# Patient Record
Sex: MALE | Race: WHITE | NOT HISPANIC OR LATINO | Employment: OTHER | URBAN - METROPOLITAN AREA
[De-identification: names, ages, dates, MRNs, and addresses within clinical notes are randomized per-mention and may not be internally consistent; named-entity substitution may affect disease eponyms.]

---

## 2024-04-05 DIAGNOSIS — R97.20 ELEVATED PSA: Primary | ICD-10-CM

## 2024-04-08 ENCOUNTER — OFFICE VISIT (OUTPATIENT)
Dept: UROLOGY | Facility: CLINIC | Age: 74
End: 2024-04-08
Payer: MEDICARE

## 2024-04-08 VITALS
HEIGHT: 71 IN | BODY MASS INDEX: 32.2 KG/M2 | DIASTOLIC BLOOD PRESSURE: 80 MMHG | SYSTOLIC BLOOD PRESSURE: 116 MMHG | OXYGEN SATURATION: 94 % | WEIGHT: 230 LBS | HEART RATE: 76 BPM

## 2024-04-08 DIAGNOSIS — N40.1 BPH WITH URINARY OBSTRUCTION: Primary | ICD-10-CM

## 2024-04-08 DIAGNOSIS — R97.20 ELEVATED PSA: ICD-10-CM

## 2024-04-08 DIAGNOSIS — R35.1 NOCTURIA: ICD-10-CM

## 2024-04-08 DIAGNOSIS — N13.8 BPH WITH URINARY OBSTRUCTION: Primary | ICD-10-CM

## 2024-04-08 PROCEDURE — 3008F BODY MASS INDEX DOCD: CPT | Mod: CPTII,S$GLB,,

## 2024-04-08 PROCEDURE — 3074F SYST BP LT 130 MM HG: CPT | Mod: CPTII,S$GLB,,

## 2024-04-08 PROCEDURE — 1101F PT FALLS ASSESS-DOCD LE1/YR: CPT | Mod: CPTII,S$GLB,,

## 2024-04-08 PROCEDURE — 1160F RVW MEDS BY RX/DR IN RCRD: CPT | Mod: CPTII,S$GLB,,

## 2024-04-08 PROCEDURE — 3288F FALL RISK ASSESSMENT DOCD: CPT | Mod: CPTII,S$GLB,,

## 2024-04-08 PROCEDURE — 1126F AMNT PAIN NOTED NONE PRSNT: CPT | Mod: CPTII,S$GLB,,

## 2024-04-08 PROCEDURE — 1159F MED LIST DOCD IN RCRD: CPT | Mod: CPTII,S$GLB,,

## 2024-04-08 PROCEDURE — 99204 OFFICE O/P NEW MOD 45 MIN: CPT | Mod: S$GLB,,,

## 2024-04-08 PROCEDURE — 3079F DIAST BP 80-89 MM HG: CPT | Mod: CPTII,S$GLB,,

## 2024-04-08 PROCEDURE — 4010F ACE/ARB THERAPY RXD/TAKEN: CPT | Mod: CPTII,S$GLB,,

## 2024-04-08 RX ORDER — LISINOPRIL AND HYDROCHLOROTHIAZIDE 12.5; 2 MG/1; MG/1
2 TABLET ORAL DAILY
COMMUNITY
Start: 2024-04-03

## 2024-04-08 RX ORDER — LABETALOL 100 MG/1
100 TABLET, FILM COATED ORAL ONCE
COMMUNITY
Start: 2023-12-20

## 2024-04-08 RX ORDER — DIAZEPAM 10 MG/1
10 TABLET ORAL 3 TIMES DAILY
COMMUNITY
Start: 2024-03-25

## 2024-04-08 RX ORDER — TAMSULOSIN HYDROCHLORIDE 0.4 MG/1
0.4 CAPSULE ORAL DAILY
Qty: 90 CAPSULE | Refills: 3 | Status: SHIPPED | OUTPATIENT
Start: 2024-04-08 | End: 2025-04-08

## 2024-04-08 RX ORDER — METFORMIN HYDROCHLORIDE 500 MG/1
500 TABLET, EXTENDED RELEASE ORAL 2 TIMES DAILY WITH MEALS
COMMUNITY
Start: 2024-03-18

## 2024-04-08 NOTE — PROGRESS NOTES
Subjective:       Patient ID: Alen Faye is a 73 y.o. male.    Chief Complaint: Elevated PSA      HPI: 73-year-old male new referral from Dr. Bailey sent to clinic today for elevated PSA.  No PSA results were sent with referral and patient unable to remember his PSA results.  Patient reports he was told his last 2 PSA levels have been elevated.  Patient reports mild symptoms of BPH.  He complains of a weaker urinary stream, urinary urgency, and nocturia x2.  He denies urinary frequency, dysuria, foul odor, fever, body aches, bone pain, unexpected weight loss, or decreased urinary output.  Patient reports he currently drinks 2 cokes per day and drinks water routinely until bedtime.       Past Medical History:   Past Medical History:   Diagnosis Date    Type II diabetes mellitus        Past Surgical Historical:   Past Surgical History:   Procedure Laterality Date    FINGER SURGERY Right     middle and ring finger for trigger finger    SHOULDER ARTHROSCOPY Left         Medications:   Medication List with Changes/Refills   New Medications    TAMSULOSIN (FLOMAX) 0.4 MG CAP    Take 1 capsule (0.4 mg total) by mouth once daily.   Current Medications    DIAZEPAM (VALIUM) 10 MG TAB    Take 10 mg by mouth 3 (three) times daily.    LABETALOL (NORMODYNE) 100 MG TABLET    Take 100 mg by mouth once.    LISINOPRIL-HYDROCHLOROTHIAZIDE (PRINZIDE,ZESTORETIC) 20-12.5 MG PER TABLET    Take 2 tablets by mouth once daily.    METFORMIN (GLUCOPHAGE-XR) 500 MG ER 24HR TABLET    Take 500 mg by mouth 2 (two) times daily with meals. Two in the morning        Past Social History:   Social History     Socioeconomic History    Marital status:    Tobacco Use    Smoking status: Never     Passive exposure: Never    Smokeless tobacco: Never   Substance and Sexual Activity    Alcohol use: Not Currently     Alcohol/week: 2.0 standard drinks of alcohol     Types: 2 Cans of beer per week     Comment: socially about 1-2 beers       Allergies:  Review of patient's allergies indicates:  No Known Allergies     Family History:   Family History   Problem Relation Age of Onset    Parkinsonism Father     Alzheimer's disease Father     Throat cancer Mother         Review of Systems:  Review of Systems   Constitutional: Negative.    HENT: Negative.     Eyes: Negative.    Respiratory: Negative.     Cardiovascular: Negative.    Gastrointestinal: Negative.    Endocrine: Negative.    Genitourinary:  Positive for urgency.        Nocturia   Musculoskeletal: Negative.    Skin: Negative.    Allergic/Immunologic: Negative.    Neurological: Negative.    Hematological: Negative.    Psychiatric/Behavioral: Negative.       Physical Exam:  Physical Exam  Constitutional:       Appearance: Normal appearance.   Cardiovascular:      Rate and Rhythm: Normal rate.   Pulmonary:      Effort: Pulmonary effort is normal.   Abdominal:      General: Bowel sounds are normal.      Palpations: Abdomen is soft.   Genitourinary:     Penis: Normal.       Testes: Normal.      Comments: Deferred LEATHA  Neurological:      Mental Status: He is alert and oriented to person, place, and time.   PVR 0 mL  Assessment/Plan:     Elevated PSA/BPH with obstruction:  We will obtain PSA today due to no available previous results.  We will start patient on Flomax for decreased urinary stream and nocturia.  Had discussion with patient about excessive oral fluid intake and decreasing fluids 4 hours prior to bedtime to reduce symptoms of nocturia.    Patient had questions about next steps if PSA is elevated.  Discussed MRI versus prostate biopsy and risk versus benefit of both procedures.  We will notify patient of PSA results and schedule follow-up  Problem List Items Addressed This Visit    None  Visit Diagnoses       Elevated PSA    -  Primary    Relevant Orders    Prostate Specific Antigen, Diagnostic    Nocturia        Relevant Medications    tamsulosin (FLOMAX) 0.4 mg Cap    Other Relevant Orders    POCT  Bladder Scan    Prostate Specific Antigen, Diagnostic

## 2024-04-09 LAB — PSA, DIAGNOSTIC: 5.2 NG/ML

## 2024-04-15 ENCOUNTER — TELEPHONE (OUTPATIENT)
Dept: UROLOGY | Facility: CLINIC | Age: 74
End: 2024-04-15
Payer: MEDICARE

## 2024-04-15 NOTE — TELEPHONE ENCOUNTER
"Unable to reach patient after attempting to contact him three times, no voicemail due to " The wireless caller you are trying to reach is unavailable, please try your call again"  "

## 2024-04-15 NOTE — TELEPHONE ENCOUNTER
----- Message from Anne Riddle LPN sent at 4/10/2024  5:08 PM CDT -----  Spoke with patient in regards to PSA results. Patient verbalized understanding and would like to follow up in 6 months. Was able to schedule nurse visit in October.   Documented follow up with Aida Rico on sheet to be placed in system.   ----- Message -----  From: Aida Rico NP  Sent: 4/9/2024   8:09 AM CDT  To: Jacky Parra Staff    Please call patient and inform PSA is 5.20.  Please call patient and ask him if he would like to have MRI or prostate biopsy.  If he wants to be conservative we can recheck his PSA in 6 months and hold off on either of these procedures.

## 2024-04-15 NOTE — TELEPHONE ENCOUNTER
----- Message from Nirmala Morales sent at 4/15/2024  4:35 PM CDT -----  Regarding: FW: Return Call  Contact: patient    ----- Message -----  From: Navid Matos MA  Sent: 4/15/2024   4:30 PM CDT  To: Josep Kang Staff  Subject: FW: Return Call                                    ----- Message -----  From: Sharonda Jimenez  Sent: 4/15/2024   2:53 PM CDT  To: Kayley Kenny Staff  Subject: Return Call                                      Type:  Patient Returning Call    Who Called:Alen   Who Left Message for Patient: Kimberly   Does the patient know what this is regarding?:psa  Would the patient rather a call back or a response via [facility]?  Call back   Best Call Back Number: [phone] (home)     Additional Information:     TERENCE Tavares

## 2024-09-27 ENCOUNTER — TELEPHONE (OUTPATIENT)
Dept: UROLOGY | Facility: CLINIC | Age: 74
End: 2024-09-27
Payer: MEDICARE

## 2024-09-27 NOTE — TELEPHONE ENCOUNTER
Pt informed of this and will have psa sent to our office.    ----- Message from Aida Rico NP sent at 9/27/2024 10:51 AM CDT -----  His visit in October was supposed to only be a nurse visit for repeat PSA level.  Have them send me the PSA completed by his PCP for review and we can cancel his October lab draw.  After I review the current PSA level we will decide on when he needs to follow up in person  ----- Message -----  From: Saray Salinas MA  Sent: 9/27/2024  10:07 AM CDT  To: Aida Rico NP    Pt had psa done today with another provider. He sees you on 10/21. Will this psa be ok or do yoi want him to come for blood draw prior  ----- Message -----  From: Mile Victoria  Sent: 9/27/2024   9:55 AM CDT  To: Jacky Parra Staff    Type:  Needs Medical Advice    Who Called: pt  Symptoms (please be specific): na   How long has patient had these symptoms:  na  Pharmacy name and phone #:  na  Would the patient rather a call back or a response via [facility]? call  Best Call Back Number: [phone]    Additional Information: requesting a call as he completed a psa for today and would like to see if he will need to repeat in oct or if todays results will suffice

## 2024-10-02 ENCOUNTER — TELEPHONE (OUTPATIENT)
Dept: UROLOGY | Facility: CLINIC | Age: 74
End: 2024-10-02
Payer: MEDICARE

## 2024-10-02 NOTE — TELEPHONE ENCOUNTER
Patient had a nurse visit for a PSA draw in October scheduled however his PCP recently true his PSA and it is elevated.  Please change patient has appointment to an in-person visit to see me to discuss further intervention

## 2024-10-21 ENCOUNTER — OFFICE VISIT (OUTPATIENT)
Dept: UROLOGY | Facility: CLINIC | Age: 74
End: 2024-10-21
Payer: MEDICARE

## 2024-10-21 VITALS
BODY MASS INDEX: 31.5 KG/M2 | DIASTOLIC BLOOD PRESSURE: 64 MMHG | RESPIRATION RATE: 18 BRPM | WEIGHT: 225 LBS | HEART RATE: 70 BPM | HEIGHT: 71 IN | OXYGEN SATURATION: 98 % | SYSTOLIC BLOOD PRESSURE: 112 MMHG

## 2024-10-21 DIAGNOSIS — R97.20 ELEVATED PSA: Primary | ICD-10-CM

## 2024-10-21 PROCEDURE — 4010F ACE/ARB THERAPY RXD/TAKEN: CPT | Mod: CPTII,S$GLB,,

## 2024-10-21 PROCEDURE — 3288F FALL RISK ASSESSMENT DOCD: CPT | Mod: CPTII,S$GLB,,

## 2024-10-21 PROCEDURE — 1126F AMNT PAIN NOTED NONE PRSNT: CPT | Mod: CPTII,S$GLB,,

## 2024-10-21 PROCEDURE — 3074F SYST BP LT 130 MM HG: CPT | Mod: CPTII,S$GLB,,

## 2024-10-21 PROCEDURE — 3008F BODY MASS INDEX DOCD: CPT | Mod: CPTII,S$GLB,,

## 2024-10-21 PROCEDURE — 1160F RVW MEDS BY RX/DR IN RCRD: CPT | Mod: CPTII,S$GLB,,

## 2024-10-21 PROCEDURE — 1159F MED LIST DOCD IN RCRD: CPT | Mod: CPTII,S$GLB,,

## 2024-10-21 PROCEDURE — 3078F DIAST BP <80 MM HG: CPT | Mod: CPTII,S$GLB,,

## 2024-10-21 PROCEDURE — 1101F PT FALLS ASSESS-DOCD LE1/YR: CPT | Mod: CPTII,S$GLB,,

## 2024-10-21 PROCEDURE — 99214 OFFICE O/P EST MOD 30 MIN: CPT | Mod: S$GLB,,,

## 2024-10-21 RX ORDER — SEMAGLUTIDE 0.68 MG/ML
INJECTION, SOLUTION SUBCUTANEOUS
COMMUNITY
Start: 2024-10-05

## 2024-10-21 RX ORDER — HYDROGEN PEROXIDE 3 %
SOLUTION, NON-ORAL MISCELLANEOUS
COMMUNITY
Start: 2024-09-14

## 2024-10-21 NOTE — PROGRESS NOTES
Subjective:       Patient ID: Alen Faye is a 74 y.o. male.    Chief Complaint: No chief complaint on file.      HPI: 74-year-old male established patient presents today for discussion elevated PSA.  Patient recently had his PSA level checked by his PCP which resulted in 7.42.  This has increased since his last check in April where it was 5.20.  Patient denies any lower urinary tract symptoms including dysuria, frequency, urgency, rectal pain/pressure, perineal pressure, bone pain, unexplained/unintentional weight loss/weight gain, or changes in appetite.  He denies a known family history of prostate cancer.  He does have a history of BPH currently maintained with Flomax.  He reports his stream is strong from start to finish with occasional spraying.    PSA:  9/23/24  7.42  4/9/24    5.20       Past Medical History:   Past Medical History:   Diagnosis Date    Type II diabetes mellitus        Past Surgical Historical:   Past Surgical History:   Procedure Laterality Date    FINGER SURGERY Right     middle and ring finger for trigger finger    SHOULDER ARTHROSCOPY Left         Medications:   Medication List with Changes/Refills   Current Medications    DIAZEPAM (VALIUM) 10 MG TAB    Take 10 mg by mouth 3 (three) times daily.    ESOMEPRAZOLE (NEXIUM) 20 MG CAPSULE    Take as needed    LABETALOL (NORMODYNE) 100 MG TABLET    Take 100 mg by mouth once.    LISINOPRIL-HYDROCHLOROTHIAZIDE (PRINZIDE,ZESTORETIC) 20-12.5 MG PER TABLET    Take 2 tablets by mouth once daily.    METFORMIN (GLUCOPHAGE-XR) 500 MG ER 24HR TABLET    Take 500 mg by mouth 2 (two) times daily with meals. Two in the morning    OZEMPIC 0.25 MG OR 0.5 MG (2 MG/3 ML) PEN INJECTOR    INJECT 0.5 MG SUBCUTANEOUSLY ONCE A WEEK    TAMSULOSIN (FLOMAX) 0.4 MG CAP    Take 1 capsule (0.4 mg total) by mouth once daily.        Past Social History:   Social History     Socioeconomic History    Marital status:    Tobacco Use    Smoking status: Never     Passive  exposure: Never    Smokeless tobacco: Never   Substance and Sexual Activity    Alcohol use: Not Currently     Alcohol/week: 2.0 standard drinks of alcohol     Types: 2 Cans of beer per week     Comment: socially about 1-2 beers       Allergies: Review of patient's allergies indicates:  No Known Allergies     Family History:   Family History   Problem Relation Name Age of Onset    Parkinsonism Father      Alzheimer's disease Father      Throat cancer Mother          Review of Systems:  Review of Systems   Constitutional: Negative.    HENT: Negative.     Eyes: Negative.    Respiratory: Negative.     Cardiovascular: Negative.    Gastrointestinal: Negative.    Endocrine: Negative.    Genitourinary: Negative.    Musculoskeletal: Negative.    Skin: Negative.    Allergic/Immunologic: Negative.    Neurological: Negative.    Hematological: Negative.    Psychiatric/Behavioral: Negative.         Physical Exam:  Physical Exam  Constitutional:       Appearance: Normal appearance.   Cardiovascular:      Rate and Rhythm: Normal rate.   Pulmonary:      Effort: Pulmonary effort is normal.   Abdominal:      General: Bowel sounds are normal.      Palpations: Abdomen is soft.   Genitourinary:     Comments: Deferred LEATHA  Neurological:      Mental Status: He is alert and oriented to person, place, and time.       Assessment/Plan:     Elevated PSA:  Discussed with patient undergoing prostate biopsy however patient has some reservations about the procedure.  We will schedule him for MRI of the prostate.  We did discuss that if his MRI results and PI-RADS 3 4 or 5 he would need to undergo prostate biopsy to determine if prostate cancer was present.  Patient is agreeable to undergo prostate biopsy if MRI results indicate the need.    Follow up to be arranged pending results of MRI  Problem List Items Addressed This Visit    None  Visit Diagnoses       Elevated PSA    -  Primary    Relevant Orders    MRI Prostate W W/O Contrast

## 2024-11-14 ENCOUNTER — TELEPHONE (OUTPATIENT)
Dept: UROLOGY | Facility: CLINIC | Age: 74
End: 2024-11-14
Payer: MEDICARE

## 2024-11-14 NOTE — TELEPHONE ENCOUNTER
Notified patient of results and he agreered to have biopsy. Would like to have it done in hospital . Was driving and could not make surgery appt at this time. Will come in for consents a chose date then.           ----- Message from Aida Rico NP sent at 11/14/2024  8:02 AM CST -----  Please call and inform patient his MRI of the prostate indicates a PI-RADS 5 which gives him a very high significance that cancer is present.  At this time I recommend he undergo a targeted prostate biopsy.  Please call and discuss with patient to ensure he is agreeable to the prostate biopsy and if he is place the order and evaluate if clearance as needed

## 2024-11-14 NOTE — TELEPHONE ENCOUNTER
----- Message from Aida Rico NP sent at 11/14/2024  8:02 AM CST -----  Please call and inform patient his MRI of the prostate indicates a PI-RADS 5 which gives him a very high significance that cancer is present.  At this time I recommend he undergo a targeted prostate biopsy.  Please call and discuss with patient to ensure he is agreeable to the prostate biopsy and if he is place the order and evaluate if clearance as needed

## 2024-11-20 ENCOUNTER — CLINICAL SUPPORT (OUTPATIENT)
Dept: UROLOGY | Facility: CLINIC | Age: 74
End: 2024-11-20
Payer: MEDICARE

## 2024-11-20 DIAGNOSIS — R97.20 ELEVATED PSA: Primary | ICD-10-CM

## 2024-11-20 DIAGNOSIS — R93.5 ABNORMAL MRI, PELVIS: ICD-10-CM

## 2024-11-20 NOTE — PROGRESS NOTES
Updated care team members, medications, medical and surgical history. Put in procedure order.    Consents and education completed for TRUS BX at [facility] on 12/4/24 provided clearance. Pt given highlighted written instructions after I verbally went over them with him, copy of faxed pre op order and medication list. Provided pt clinic phone number to call with any concerns or questions before or after procedure. Pt was given opportunity to ask questions which he did and I answered them and referred back to highlighted written instructions.

## 2024-11-21 RX ORDER — CIPROFLOXACIN 500 MG/1
500 TABLET ORAL 2 TIMES DAILY
Qty: 8 TABLET | Refills: 0 | Status: SHIPPED | OUTPATIENT
Start: 2024-11-21 | End: 2024-11-25

## 2024-11-25 LAB
ANION GAP SERPL CALC-SCNC: 4 MMOL/L (ref 3–11)
APPEARANCE, UA: CLEAR
BASOPHILS NFR BLD: 0.6 % (ref 0–3)
BILIRUB UR QL STRIP: NEGATIVE MG/DL
BUN SERPL-MCNC: 23 MG/DL (ref 7–18)
BUN/CREAT SERPL: 23.71 RATIO (ref 7–18)
CALCIUM SERPL-MCNC: 9.2 MG/DL (ref 8.8–10.5)
CHLORIDE SERPL-SCNC: 103 MMOL/L (ref 100–108)
CO2 SERPL-SCNC: 30 MMOL/L (ref 21–32)
COLOR UR: NORMAL
CREAT SERPL-MCNC: 0.97 MG/DL (ref 0.7–1.3)
EOSINOPHIL NFR BLD: 1.7 % (ref 1–3)
ERYTHROCYTE [DISTWIDTH] IN BLOOD BY AUTOMATED COUNT: 13.4 % (ref 12.5–18)
GFR ESTIMATION: 82
GLUCOSE (UA): NORMAL MG/DL
GLUCOSE SERPL-MCNC: 100 MG/DL (ref 70–110)
HCT VFR BLD AUTO: 37.6 % (ref 42–52)
HGB BLD-MCNC: 12.6 G/DL (ref 14–18)
HGB UR QL STRIP: NEGATIVE /UL
KETONES UR QL STRIP: NEGATIVE MG/DL
LEUKOCYTE ESTERASE UR QL STRIP: NEGATIVE /UL
LYMPHOCYTES NFR BLD: 21.2 % (ref 25–40)
MCH RBC QN AUTO: 28.3 PG (ref 27–31.2)
MCHC RBC AUTO-ENTMCNC: 33.5 G/DL (ref 31.8–35.4)
MCV RBC AUTO: 84.5 FL (ref 80–97)
MONOCYTES NFR BLD: 13.4 % (ref 1–15)
NEUTROPHILS # BLD AUTO: 3.95 10*3/UL (ref 1.8–7.7)
NEUTROPHILS NFR BLD: 62.6 % (ref 37–80)
NITRITE UR QL STRIP: NEGATIVE
NUCLEATED RED BLOOD CELLS: 0 %
PH UR STRIP: 5 PH (ref 5–8)
PLATELETS: 184 10*3/UL (ref 142–424)
POTASSIUM SERPL-SCNC: 4.4 MMOL/L (ref 3.6–5.2)
PROT UR QL STRIP: NEGATIVE MG/DL
RBC # BLD AUTO: 4.45 10*6/UL (ref 4.7–6.1)
SODIUM BLD-SCNC: 137 MMOL/L (ref 135–145)
SP GR UR STRIP: 1.01 (ref 1–1.03)
SPECIMEN COLLECTION METHOD, URINE: NORMAL
UROBILINOGEN UR STRIP-ACNC: NORMAL MG/DL
WBC # BLD: 6.3 10*3/UL (ref 4.6–10.2)

## 2024-12-03 ENCOUNTER — TELEPHONE (OUTPATIENT)
Dept: UROLOGY | Facility: CLINIC | Age: 74
End: 2024-12-03
Payer: MEDICARE

## 2024-12-03 DIAGNOSIS — R97.20 ELEVATED PSA: Primary | ICD-10-CM

## 2024-12-03 NOTE — TELEPHONE ENCOUNTER
Spoke with pt about [facility] canceled TRUS bx related to abnormal EKG. Pt does not currently have a cardiologist, but told me his pcp referred him to one. I offered doing TRUS BX in clinic since we would not need clearance. (Spoke with ANGELIQUE, NP). Pt states he will think about it, see how long it takes to see cardio and will call us in a few days.

## 2024-12-03 NOTE — TELEPHONE ENCOUNTER
Contacted pt, he is wanting to proceed with in clinic bx. He states that his PCP has referred him to a cardiologist, he is not sure of when he will be scheduled nor cleared. He states that if he is cleared before scheduled in clinic BX and can proceed with hospital BX before that time he would rather that. Advised pt contact us once he have Cardiac appt. Pt verbalized understanding. BJP    ----- Message from [employer] sent at 12/3/2024  2:36 PM CST -----  Contact: self  Type:  Patient Returning Call    Who Called:Alen Faye  Who Left Message for Patient:unsure  Does the patient know what this is regarding?:bx at office instead of hospital  Would the patient rather a call back or a response via [facility]?   Best Call Back Number:[phone]  Additional Information: n/a

## 2024-12-23 ENCOUNTER — TELEPHONE (OUTPATIENT)
Dept: UROLOGY | Facility: CLINIC | Age: 74
End: 2024-12-23
Payer: MEDICARE

## 2025-01-03 RX ORDER — APIXABAN 5 MG/1
TABLET, FILM COATED ORAL
COMMUNITY
Start: 2024-12-11

## 2025-01-06 ENCOUNTER — TELEPHONE (OUTPATIENT)
Dept: UROLOGY | Facility: CLINIC | Age: 75
End: 2025-01-06

## 2025-01-06 ENCOUNTER — CLINICAL SUPPORT (OUTPATIENT)
Dept: UROLOGY | Facility: CLINIC | Age: 75
End: 2025-01-06
Payer: MEDICARE

## 2025-01-06 DIAGNOSIS — Z79.2 PROPHYLACTIC ANTIBIOTIC: Primary | ICD-10-CM

## 2025-01-06 DIAGNOSIS — R97.20 ELEVATED PSA: ICD-10-CM

## 2025-01-06 DIAGNOSIS — R93.5 ABNORMAL MRI, PELVIS: Primary | ICD-10-CM

## 2025-01-06 RX ORDER — CIPROFLOXACIN 500 MG/1
500 TABLET ORAL 2 TIMES DAILY
Qty: 8 TABLET | Refills: 0 | Status: SHIPPED | OUTPATIENT
Start: 2025-01-06 | End: 2025-01-10

## 2025-01-06 RX ORDER — ISOSORBIDE MONONITRATE 30 MG/1
30 TABLET, EXTENDED RELEASE ORAL DAILY
COMMUNITY
Start: 2025-01-06

## 2025-01-06 NOTE — TELEPHONE ENCOUNTER
Notified patient that cardiac clearance had been received from Dr. Sae Dumont's office & that it is ok to proceed with his prostate biopsy scheduled on 1/21/2025 & that he needs to stop his eliquis 72 hours prior to procedure. Verbalized understanding.

## 2025-01-06 NOTE — PROGRESS NOTES
Patient here for prostate biopsy education & to sign consents. Educated on procedure including risks & alternatives. Pre-procedure instructions reviewed & copy given. Patient  states that his cardiologist would not clear him for hospital prostate biopsy, however he did clear him for in-office biopsy. Patient is taking eliquis 5 mg BID. He stated that we should receive a clearance from Dr. Dumont today letting us know when he needs to stop taking eliquis. Instructed that we would call & let him know as soon as we receive. Instructed him to begin taking cipro 500 mg BID on Sunday, January 20, 2025 & to follow instructions on prescription to complete 4 day regimen. Questions answered, verbalized understanding & consents signed.

## 2025-02-06 ENCOUNTER — PROCEDURE VISIT (OUTPATIENT)
Dept: UROLOGY | Facility: CLINIC | Age: 75
End: 2025-02-06
Payer: MEDICARE

## 2025-02-06 VITALS
OXYGEN SATURATION: 97 % | SYSTOLIC BLOOD PRESSURE: 111 MMHG | HEART RATE: 98 BPM | RESPIRATION RATE: 20 BRPM | DIASTOLIC BLOOD PRESSURE: 55 MMHG | BODY MASS INDEX: 31.1 KG/M2 | WEIGHT: 222.13 LBS | HEIGHT: 71 IN

## 2025-02-06 DIAGNOSIS — R97.20 ELEVATED PSA: ICD-10-CM

## 2025-02-06 PROCEDURE — 76872 US TRANSRECTAL: CPT | Mod: 26,S$PBB,, | Performed by: UROLOGY

## 2025-02-06 PROCEDURE — 55700 TRANSRECTAL ULTRASOUND W/ BIOPSY: CPT | Mod: S$PBB,,, | Performed by: UROLOGY

## 2025-02-06 NOTE — PROCEDURES
"Transrectal Ultrasound w/ Biopsy    Date/Time: 2/6/2025 2:00 PM    Performed by: Efraín Zaldivar MD  Authorized by: Efraín Zaldivar MD    Consent Done?:  Yes (Written)  Time out: Immediately prior to procedure a "time out" was called to verify the correct patient, procedure, equipment, support staff and site/side marked as required.    Indications: Elevated PSA    Position:  Left lateral  Anesthesia:  Pudendal nerve block  Patient sedated: No    Prostate Size:  32g  Left Base Biopsies: 2  Left Mid Biopsies: 2  Left [place] Biopsies: 2  Right Base Biopsies: 2  Right Mid Biopsies: 2  Right [place] Biopsies: 2  Total Biopsies:  12    Patient tolerance:  Patient tolerated the procedure well with no immediate complications     Patient was brought to the procedure room placed on the table in left lateral decubitus position lidocaine jelly was instilled into the rectum the ultrasound probe was advanced to level of prostate and a total of 10 cc of lidocaine was injected into the bilateral [place] prostatic fossa.  A standard 12 core prostate biopsy was obtained patient tolerated the procedure well there were no complications    "

## 2025-02-06 NOTE — PATIENT INSTRUCTIONS
NO STRENUOUS ACTIVITY FOR 3 DAYS  NO SEXUAL INTERCOURSE FOR 3 DAYS  YOU MAY NOTICE BLOOD IN URINE OR SEMEN FOR SEVERAL DAYS                What to Expect After a Prostate Biopsy    You may have mild bleeding from the rectum or urine for about 1 week to 1 month, or in your ejaculate for several months. This bleeding is normal and expected, and it will stop. You may have mild discomfort in your rectal or urethral area for 24-48 hours.    You cannot do any strenuous lifting, straining, or exercising for 72 hours. You may return to full activity 3 days after the biopsy.    You may continue to take all your regular medications after the procedure except for the blood thinners.    You may resume all blood-thinning medications once you no longer see any bleeding or whenever your physician prescribing the medication says it is all right to do so. You may take Tylenol if you have a fever and your temperature is less than 100° F or if you have some discomfort.    You will receive a call from the Urology Department at [facility] with the results of your prostate biopsy within one week.    Signs and Symptoms to Report    Call your [facility] urologist at [phone] if you develop any of the following:  Temperature greater than 101°  F  Inability to urinate  A large amount of bleeding from the rectum or in the urine  Persistent or severe pain    After hours or on weekends, you may reach a urology resident on call at this number: [phone].

## 2025-02-18 ENCOUNTER — OFFICE VISIT (OUTPATIENT)
Dept: UROLOGY | Facility: CLINIC | Age: 75
End: 2025-02-18
Payer: MEDICARE

## 2025-02-18 DIAGNOSIS — C61 PROSTATE CANCER: Primary | ICD-10-CM

## 2025-02-18 NOTE — PROGRESS NOTES
Subjective:       Patient ID: Alen Faye is a 74 y.o. male.    Chief Complaint: biopsy results      HPI: 74-year-old male established patient presents today for initial prostate biopsy results completed on 02/06/2025.  Most recent PSA on 09/23/2024 had increased to 7.42 from 5.20 with his PCP.  Patient had MRI of prostate on 11/12/2014 which showed PI-RADS 5 lesion.  Biopsy resulted with 2/12 cores [place] 9, 1/12 core [place] 8, 1/12 core [place] 7, and 1/12 core [place] 6.    PSA:  9/23/24  7.42  4/9/24    5.20       Past Medical History:   Past Medical History:   Diagnosis Date    Anxiety disorder, unspecified     GERD (gastroesophageal reflux disease)     Hypertension     Skin cancer     Type II diabetes mellitus        Past Surgical Historical:   Past Surgical History:   Procedure Laterality Date    COLONOSCOPY      FINGER SURGERY Right     middle and ring finger for trigger finger    SHOULDER ARTHROSCOPY Left     SKIN CANCER EXCISION      TONSILLECTOMY          Medications:   Medication List with Changes/Refills   Current Medications    DIAZEPAM (VALIUM) 10 MG TAB    Take 10 mg by mouth 3 (three) times daily.    ELIQUIS 5 MG TAB        ESOMEPRAZOLE (NEXIUM) 20 MG CAPSULE    Take as needed    ISOSORBIDE MONONITRATE (IMDUR) 30 MG 24 HR TABLET    Take 30 mg by mouth once daily.    LABETALOL (NORMODYNE) 100 MG TABLET    Take 100 mg by mouth once.    LISINOPRIL-HYDROCHLOROTHIAZIDE (PRINZIDE,ZESTORETIC) 20-12.5 MG PER TABLET    Take 2 tablets by mouth once daily.    METFORMIN (GLUCOPHAGE-XR) 500 MG ER 24HR TABLET    Take 500 mg by mouth 2 (two) times daily with meals. Two in the morning    OZEMPIC 0.25 MG OR 0.5 MG (2 MG/3 ML) PEN INJECTOR    INJECT 0.5 MG SUBCUTANEOUSLY ONCE A WEEK    TAMSULOSIN (FLOMAX) 0.4 MG CAP    Take 1 capsule (0.4 mg total) by mouth once daily.        Past Social History:   Social History     Socioeconomic History    Marital status:    Tobacco Use    Smoking status: Never      Passive exposure: Never    Smokeless tobacco: Never   Substance and Sexual Activity    Alcohol use: Not Currently     Alcohol/week: 2.0 standard drinks of alcohol     Types: 2 Cans of beer per week     Comment: socially about 1-2 beers       Allergies: Review of patient's allergies indicates:  No Known Allergies     Family History:   Family History   Problem Relation Name Age of Onset    Parkinsonism Father      Alzheimer's disease Father      Throat cancer Mother          Review of Systems:  Review of Systems   Constitutional: Negative.    HENT: Negative.     Eyes: Negative.    Respiratory: Negative.     Cardiovascular: Negative.    Gastrointestinal: Negative.    Endocrine: Negative.    Genitourinary: Negative.    Musculoskeletal: Negative.    Skin: Negative.    Allergic/Immunologic: Negative.    Neurological: Negative.    Hematological: Negative.    Psychiatric/Behavioral: Negative.         Physical Exam:  Physical Exam  Constitutional:       General: He is not in acute distress.     Appearance: Normal appearance. He is well-developed.   HENT:      Head: Normocephalic and atraumatic.      Nose: Nose normal.   Eyes:      Pupils: Pupils are equal, round, and reactive to light.   Cardiovascular:      Rate and Rhythm: Normal rate and regular rhythm.   Pulmonary:      Effort: Pulmonary effort is normal. No respiratory distress.   Abdominal:      General: Bowel sounds are normal. There is no distension.      Palpations: Abdomen is soft.   Genitourinary:     Comments: Deferred LEATHA  Musculoskeletal:         General: No deformity. Normal range of motion.      Cervical back: Normal range of motion.   Skin:     General: Skin is warm and dry.   Neurological:      Mental Status: He is alert and oriented to person, place, and time.   Psychiatric:         Behavior: Behavior normal.         Assessment/Plan:     Prostate cancer:  I had a long discussion with the patient regarding treatment for prostate cancer and all of the  available options.  We discussed external beam radiation and all of the risks and benefits, discussed brachytherapy as well as robotic assisted radical prostatectomy.  We had a long discussion on surgery itself including the risks of worsening erectile dysfunction, urinary incontinence, injury to the bowel or anastomotic leak.  All questions were answered the patient reported having a clear understanding of the treatment of prostate cancer and the risk and benefits of each modality.  The appropriate amount of time was spent in face-to-face communication discussing the treatment for prostate cancer.    Order placed for CT and bone scan.  We will also refer the patient to Dr. Samson for further evaluation and treatment      Problem List Items Addressed This Visit    None

## 2025-02-21 ENCOUNTER — RESULTS FOLLOW-UP (OUTPATIENT)
Dept: UROLOGY | Facility: CLINIC | Age: 75
End: 2025-02-21
Payer: MEDICARE

## 2025-02-21 NOTE — TELEPHONE ENCOUNTER
Spoke with pt and informed of results.    ----- Message from Milagro Pitts NP sent at 2/21/2025  1:38 PM CST -----  Please notify patient of bone scan results.  Patient should still have CT abdomen and pelvis completed and continue follow up with Dr. Samson.    No evidence of osseous metastatic disease based on bone scan.  Continue for further evaluation with CT of abdomen and pelvis  ----- Message -----  From: Interface, Incoming Rad Results  Sent: 2/21/2025   1:26 PM CST  To: Milagro Pitts NP

## 2025-02-25 ENCOUNTER — RESULTS FOLLOW-UP (OUTPATIENT)
Dept: UROLOGY | Facility: CLINIC | Age: 75
End: 2025-02-25
Payer: MEDICARE

## 2025-02-25 NOTE — TELEPHONE ENCOUNTER
Spoke with pt and informed of results.    ----- Message from Efraín Zaldivar MD sent at 2/25/2025  7:44 AM CST -----  Please call the patient tell him there is no evidence of metastatic disease on the CT scan.  The CT scan looks good  ----- Message -----  From: Milagro Pitts NP  Sent: 2/24/2025   4:59 PM CST  To: Efraín Zaldivar MD      ----- Message -----  From: Samir, Incoming Rad Results  Sent: 2/24/2025  11:51 AM CST  To: Milagro Pitts NP

## 2025-02-28 ENCOUNTER — TELEPHONE (OUTPATIENT)
Dept: HEMATOLOGY/ONCOLOGY | Facility: CLINIC | Age: 75
End: 2025-02-28
Payer: MEDICARE

## 2025-02-28 NOTE — TELEPHONE ENCOUNTER
Spoke to the patient regarding referral. Appointment made per request. All questions answered. TTRN

## 2025-02-28 NOTE — TELEPHONE ENCOUNTER
Spoke to the patient who rescheduled appointment to the following week. Appointment moved per patient request. TTRN

## 2025-03-06 ENCOUNTER — OFFICE VISIT (OUTPATIENT)
Dept: UROLOGY | Facility: CLINIC | Age: 75
End: 2025-03-06
Payer: MEDICARE

## 2025-03-06 DIAGNOSIS — C61 PROSTATE CANCER: Primary | ICD-10-CM

## 2025-03-06 PROCEDURE — 4010F ACE/ARB THERAPY RXD/TAKEN: CPT | Mod: CPTII,,, | Performed by: UROLOGY

## 2025-03-06 PROCEDURE — 1159F MED LIST DOCD IN RCRD: CPT | Mod: CPTII,,, | Performed by: UROLOGY

## 2025-03-06 PROCEDURE — 99214 OFFICE O/P EST MOD 30 MIN: CPT | Mod: S$PBB,,, | Performed by: UROLOGY

## 2025-03-06 NOTE — PROGRESS NOTES
Subjective:       Patient ID: Alen Faye is a 74 y.o. male.    Chief Complaint: eligard injection      HPI: 74-year-old male established patient presents today for initial Eligard injection.  Initial prostate biopsy completed on 02/06/2025.  Most recent PSA on 09/23/2024 had increased to 7.42 from 5.20 with his PCP.  Patient had MRI of prostate on 11/12/2014 which showed PI-RADS 5 lesion.  Biopsy resulted with 2/12 cores [place] 9, 1/12 core [place] 8, 1/12 core [place] 7, and 1/12 core Oralia 6.  He had a negative CT and bone scan.  He will pre proceeding with external beam radiation and ADT as well as following up with Medical Oncology for consideration of Casodex versus Zytiga    PSA:  9/23/24  7.42  4/9/24    5.20       Past Medical History:   Past Medical History:   Diagnosis Date    Anxiety disorder, unspecified     GERD (gastroesophageal reflux disease)     Hypertension     Skin cancer     Type II diabetes mellitus        Past Surgical Historical:   Past Surgical History:   Procedure Laterality Date    COLONOSCOPY      FINGER SURGERY Right     middle and ring finger for trigger finger    SHOULDER ARTHROSCOPY Left     SKIN CANCER EXCISION      TONSILLECTOMY          Medications:   Medication List with Changes/Refills   Current Medications    DIAZEPAM (VALIUM) 10 MG TAB    Take 10 mg by mouth 3 (three) times daily.    ELIQUIS 5 MG TAB        ESOMEPRAZOLE (NEXIUM) 20 MG CAPSULE    Take as needed    ISOSORBIDE MONONITRATE (IMDUR) 30 MG 24 HR TABLET    Take 30 mg by mouth once daily.    LABETALOL (NORMODYNE) 100 MG TABLET    Take 100 mg by mouth once.    LISINOPRIL-HYDROCHLOROTHIAZIDE (PRINZIDE,ZESTORETIC) 20-12.5 MG PER TABLET    Take 2 tablets by mouth once daily.    METFORMIN (GLUCOPHAGE-XR) 500 MG ER 24HR TABLET    Take 500 mg by mouth 2 (two) times daily with meals. Two in the morning    OZEMPIC 0.25 MG OR 0.5 MG (2 MG/3 ML) PEN INJECTOR    INJECT 0.5 MG SUBCUTANEOUSLY ONCE A WEEK    TAMSULOSIN (FLOMAX)  0.4 MG CAP    Take 1 capsule (0.4 mg total) by mouth once daily.        Past Social History:   Social History     Socioeconomic History    Marital status:    Tobacco Use    Smoking status: Never     Passive exposure: Never    Smokeless tobacco: Never   Substance and Sexual Activity    Alcohol use: Not Currently     Alcohol/week: 2.0 standard drinks of alcohol     Types: 2 Cans of beer per week     Comment: socially about 1-2 beers       Allergies: Review of patient's allergies indicates:  No Known Allergies     Family History:   Family History   Problem Relation Name Age of Onset    Parkinsonism Father      Alzheimer's disease Father      Throat cancer Mother          Review of Systems:  Review of Systems   Constitutional: Negative.    HENT: Negative.     Eyes: Negative.    Respiratory: Negative.     Cardiovascular: Negative.    Gastrointestinal: Negative.    Endocrine: Negative.    Genitourinary: Negative.    Musculoskeletal: Negative.    Skin: Negative.    Allergic/Immunologic: Negative.    Neurological: Negative.    Hematological: Negative.    Psychiatric/Behavioral: Negative.         Physical Exam:  Physical Exam  Constitutional:       General: He is not in acute distress.     Appearance: Normal appearance. He is well-developed.   HENT:      Head: Normocephalic and atraumatic.      Nose: Nose normal.   Eyes:      Pupils: Pupils are equal, round, and reactive to light.   Cardiovascular:      Rate and Rhythm: Normal rate and regular rhythm.   Pulmonary:      Effort: Pulmonary effort is normal. No respiratory distress.   Abdominal:      General: Bowel sounds are normal. There is no distension.      Palpations: Abdomen is soft.   Genitourinary:     Comments: Deferred LEATHA  Musculoskeletal:         General: No deformity. Normal range of motion.      Cervical back: Normal range of motion.   Skin:     General: Skin is warm and dry.   Neurological:      Mental Status: He is alert and oriented to person, place, and  time.   Psychiatric:         Behavior: Behavior normal.         Assessment/Plan:     Prostate cancer:  Initial Eligard administered in clinic today.  Continue follow up with Dr. Samson as well as Medical Oncology for further evaluation.  Return to clinic in 6 months for next Eligard.    I, Dr. Efraín Zaldivar have seen and personally evaluated the patient. I have formulated the plan reviewed all pertinent imaging and clinical data.  I agree with the nurse practitioner's assessment, and I have personally formulated the plan for this patient's care as described by the midlevel.  Problem List Items Addressed This Visit    None

## 2025-03-06 NOTE — PROGRESS NOTES
Using aseptic technique, initial Eligard subq administered. Upon observation post 10 minutes, pt denies any allergic reactions. Scheduled 6 mos appt.

## 2025-03-10 ENCOUNTER — OFFICE VISIT (OUTPATIENT)
Dept: HEMATOLOGY/ONCOLOGY | Facility: CLINIC | Age: 75
End: 2025-03-10
Payer: MEDICARE

## 2025-03-10 VITALS
DIASTOLIC BLOOD PRESSURE: 62 MMHG | HEIGHT: 71 IN | TEMPERATURE: 98 F | HEART RATE: 83 BPM | BODY MASS INDEX: 31.26 KG/M2 | OXYGEN SATURATION: 96 % | SYSTOLIC BLOOD PRESSURE: 100 MMHG | WEIGHT: 223.31 LBS | RESPIRATION RATE: 16 BRPM

## 2025-03-10 DIAGNOSIS — C61 PROSTATE CANCER: Primary | ICD-10-CM

## 2025-03-10 PROCEDURE — G2211 COMPLEX E/M VISIT ADD ON: HCPCS | Mod: S$PBB,,, | Performed by: INTERNAL MEDICINE

## 2025-03-10 PROCEDURE — 4010F ACE/ARB THERAPY RXD/TAKEN: CPT | Mod: CPTII,,, | Performed by: INTERNAL MEDICINE

## 2025-03-10 PROCEDURE — 3074F SYST BP LT 130 MM HG: CPT | Mod: CPTII,,, | Performed by: INTERNAL MEDICINE

## 2025-03-10 PROCEDURE — 99205 OFFICE O/P NEW HI 60 MIN: CPT | Mod: S$PBB,,, | Performed by: INTERNAL MEDICINE

## 2025-03-10 PROCEDURE — 1159F MED LIST DOCD IN RCRD: CPT | Mod: CPTII,,, | Performed by: INTERNAL MEDICINE

## 2025-03-10 PROCEDURE — 1158F ADVNC CARE PLAN TLK DOCD: CPT | Mod: CPTII,,, | Performed by: INTERNAL MEDICINE

## 2025-03-10 PROCEDURE — 3078F DIAST BP <80 MM HG: CPT | Mod: CPTII,,, | Performed by: INTERNAL MEDICINE

## 2025-03-10 PROCEDURE — 3008F BODY MASS INDEX DOCD: CPT | Mod: CPTII,,, | Performed by: INTERNAL MEDICINE

## 2025-03-10 RX ORDER — BICALUTAMIDE 50 MG/1
50 TABLET, FILM COATED ORAL DAILY
Qty: 90 TABLET | Refills: 3 | Status: SHIPPED | OUTPATIENT
Start: 2025-03-10 | End: 2026-03-10

## 2025-03-10 RX ORDER — ZOLPIDEM TARTRATE 12.5 MG/1
12.5 TABLET, FILM COATED, EXTENDED RELEASE ORAL NIGHTLY
COMMUNITY
Start: 2025-03-06

## 2025-03-10 NOTE — PROGRESS NOTES
MEDICAL ONCOLOGY INITIAL CONSULTATION NOTE    Patient ID: Alen Faye is a 74 y.o. male.    Chief Complaint:  High-risk prostate cancer    HPI:  Patient is a 74-year-old male with past medical history of coronary artery disease, atrial fibrillation, diabetes mellitus, hypertension, hyperlipidemia, gastroesophageal reflux disease, osteoarthritis, very high-risk prostate cancer, [place] 4 + 5 and 5 x 12 cores clinically cT3 a N0 M0 with initial PSA of 7.42        Imaging:       MR prostate with and without contrast:  11/12/2024    2 x 1.3 x 1.9 cm T2 hypo, diffusion restricted, early enhancing lesion at the left base to mid lateral PZ with maryjo EPE and lateral Lt NVB invasion.  No S3, lymph node or bone involvement.  PI-RADS 5    Past Medical History:   Diagnosis Date    Anxiety disorder, unspecified     GERD (gastroesophageal reflux disease)     Hypertension     Skin cancer     Type II diabetes mellitus      Family History   Problem Relation Name Age of Onset    Parkinsonism Father      Alzheimer's disease Father      Throat cancer Mother       Social History[1]  Past Surgical History:   Procedure Laterality Date    COLONOSCOPY      FINGER SURGERY Right     middle and ring finger for trigger finger    SHOULDER ARTHROSCOPY Left     SKIN CANCER EXCISION      TONSILLECTOMY           Review of systems:  Review of Systems    Review of Systems   Constitutional: Negative for activity change, appetite change, chills, diaphoresis, fatigue and unexpected weight change.   HENT: Negative for congestion, facial swelling, hearing loss, mouth sores, trouble swallowing and voice change.    Eyes: Negative for photophobia, pain, discharge and itching.   Respiratory: Negative for apnea, cough, choking, chest tightness and shortness of breath.    Cardiovascular: Negative for chest pain, palpitations and leg swelling.   Gastrointestinal: Negative for abdominal distention, abdominal pain, anal bleeding and blood in stool.   Endocrine:  Negative for cold intolerance, heat intolerance, polydipsia and polyphagia.   Genitourinary: Negative for difficulty urinating, dysuria, flank pain and hematuria.   Musculoskeletal: Negative for arthralgias, back pain, joint swelling, myalgias, neck pain and neck stiffness.   Skin: Negative for color change, pallor and wound.   Allergic/Immunologic: Negative for environmental allergies, food allergies and immunocompromised state.   Neurological: Negative for dizziness, seizures, facial asymmetry, speech difficulty, light-headedness, numbness and headaches.   Hematological: Negative for adenopathy. Does not bruise/bleed easily.   Psychiatric/Behavioral: Negative for agitation, behavioral problems, confusion, decreased concentration and sleep disturbance.       Physical Exam  Vitals and nursing note reviewed.   Constitutional:       General: He is not in acute distress.     Appearance: Normal appearance. He is not ill-appearing.   HENT:      Head: Normocephalic and atraumatic.      Nose: No congestion or rhinorrhea.   Eyes:      General: No scleral icterus.     Extraocular Movements: Extraocular movements intact.      Pupils: Pupils are equal, round, and reactive to light.   Cardiovascular:      Rate and Rhythm: Normal rate and regular rhythm.      Pulses: Normal pulses.      Heart sounds: Normal heart sounds. No murmur heard.     No gallop.   Pulmonary:      Effort: Pulmonary effort is normal. No respiratory distress.      Breath sounds: Normal breath sounds. No stridor. No wheezing or rhonchi.   Abdominal:      General: Bowel sounds are normal. There is no distension.      Palpations: There is no mass.      Tenderness: There is no abdominal tenderness. There is no guarding.   Musculoskeletal:         General: No swelling, tenderness, deformity or signs of injury. Normal range of motion.      Cervical back: Normal range of motion and neck supple. No rigidity. No muscular tenderness.      Right lower leg: No edema.       Left lower leg: No edema.   Skin:     General: Skin is warm.      Coloration: Skin is not jaundiced or pale.      Findings: No bruising or lesion.   Neurological:      General: No focal deficit present.      Mental Status: He is alert and oriented to person, place, and time.      Cranial Nerves: No cranial nerve deficit.      Sensory: No sensory deficit.      Motor: No weakness.      Gait: Gait normal.   Psychiatric:         Mood and Affect: Mood normal.         Behavior: Behavior normal.         Thought Content: Thought content normal.                                           Vitals:    03/10/25 1032   BP: 100/62   Pulse: 83   Resp: 16   Temp: 97.7 °F (36.5 °C)   Body surface area is 2.25 meters squared.    Assessment/Plan:      ECOG 1      Very high-risk prostate cancer:    == [place] 4 + 5 in 5 x 12 cores, cT3 N0 M0 with initial PSA of 7.42.    == No evidence of metastatic disease on imaging and bone scan. Patient has been evaluated by Radiation Oncology with plan for curative intent radiotherapy.  Considering very high-risk prostate cancer per NCCN guidelines patient would need androgen deprivation treatment with Eligard plus Casodex versus  ADT plus Zytiga.  After detailed discussion he is agreeable to start Casodex and I will send prescription in this regard which he needs to take for 12-36 months per NCCN guidelines. If agreeable to Zytiga then duration will be 24 months.     Plan:  Start Casodex  Continue Lupron with Urology  Follow up with Rad-onc    RTC in 6 weeks for MD visit with labs: CBC, CMP, PSA prior        Advance Care Planning     Date: 03/10/2025    Power of   I initiated the process of voluntary advance care planning today and explained the importance of this process to the patient.  I introduced the concept of advance directives to the patient, as well. Then the patient received detailed information about the importance of designating a Health Care Power of  (HCPOA). He was  also instructed to communicate with this person about their wishes for future healthcare, should he become sick and lose decision-making capacity. The patient has not previously appointed a HCPOA. After our discussion, the patient has decided to complete a HCPOA and has appointed his    , health care agent. I encouraged him to communicate with this person about their wishes for future healthcare, should he become sick and lose decision-making capacity.      A total of 20 min was spent on advance care planning, goals of care discussion, emotional support, formulating and communicating prognosis and exploring burden/benefit of various approaches of treatment. This discussion occurred on a fully voluntary basis with the verbal consent of the patient and/or family.           Total time spent in counseling and discussion about further management options including relevant lab work, treatment,  prognosis, medications and intended side effects was more than 60 minutes. More than 50% of the time was spent on counseling and coordination of care.  I spent a total of 60 minutes on the day of the visit.This includes face to face time and non-face to face time preparing to see the patient (eg, review of tests), Obtaining and/or reviewing separately obtained history, Documenting clinical information in the electronic or other health record, Independently interpreting resultsand communicating results to the patient/family/caregiver, or Care coordination.            [1]   Social History  Socioeconomic History    Marital status:    Tobacco Use    Smoking status: Never     Passive exposure: Never    Smokeless tobacco: Never   Substance and Sexual Activity    Alcohol use: Not Currently     Alcohol/week: 2.0 standard drinks of alcohol     Types: 2 Cans of beer per week     Comment: socially about 1-2 beers

## 2025-03-11 DIAGNOSIS — C61 PROSTATE CANCER: Primary | ICD-10-CM

## 2025-03-19 PROBLEM — C61 PROSTATE CANCER: Status: ACTIVE | Noted: 2025-03-19

## 2025-04-23 ENCOUNTER — OFFICE VISIT (OUTPATIENT)
Dept: HEMATOLOGY/ONCOLOGY | Facility: CLINIC | Age: 75
End: 2025-04-23
Payer: MEDICARE

## 2025-04-23 VITALS
BODY MASS INDEX: 30.61 KG/M2 | WEIGHT: 218.63 LBS | DIASTOLIC BLOOD PRESSURE: 71 MMHG | HEIGHT: 71 IN | HEART RATE: 100 BPM | OXYGEN SATURATION: 96 % | RESPIRATION RATE: 16 BRPM | SYSTOLIC BLOOD PRESSURE: 147 MMHG

## 2025-04-23 DIAGNOSIS — C61 PROSTATE CANCER: Primary | ICD-10-CM

## 2025-04-23 PROCEDURE — 3078F DIAST BP <80 MM HG: CPT | Mod: CPTII,,, | Performed by: NURSE PRACTITIONER

## 2025-04-23 PROCEDURE — 1159F MED LIST DOCD IN RCRD: CPT | Mod: CPTII,,, | Performed by: NURSE PRACTITIONER

## 2025-04-23 PROCEDURE — 99214 OFFICE O/P EST MOD 30 MIN: CPT | Mod: S$PBB,,, | Performed by: NURSE PRACTITIONER

## 2025-04-23 PROCEDURE — 1126F AMNT PAIN NOTED NONE PRSNT: CPT | Mod: CPTII,,, | Performed by: NURSE PRACTITIONER

## 2025-04-23 PROCEDURE — 3008F BODY MASS INDEX DOCD: CPT | Mod: CPTII,,, | Performed by: NURSE PRACTITIONER

## 2025-04-23 PROCEDURE — 1101F PT FALLS ASSESS-DOCD LE1/YR: CPT | Mod: CPTII,,, | Performed by: NURSE PRACTITIONER

## 2025-04-23 PROCEDURE — 4010F ACE/ARB THERAPY RXD/TAKEN: CPT | Mod: CPTII,,, | Performed by: NURSE PRACTITIONER

## 2025-04-23 PROCEDURE — 3077F SYST BP >= 140 MM HG: CPT | Mod: CPTII,,, | Performed by: NURSE PRACTITIONER

## 2025-04-23 PROCEDURE — 3288F FALL RISK ASSESSMENT DOCD: CPT | Mod: CPTII,,, | Performed by: NURSE PRACTITIONER

## 2025-04-23 NOTE — PROGRESS NOTES
MEDICAL ONCOLOGY INITIAL CONSULTATION NOTE    Patient ID: Alen Faye is a 74 y.o. male.    Chief Complaint:  High-risk prostate cancer    HPI:  Patient is a 74-year-old male with past medical history of coronary artery disease, atrial fibrillation, diabetes mellitus, hypertension, hyperlipidemia, gastroesophageal reflux disease, osteoarthritis, very high-risk prostate cancer, [place] 4 + 5 and 5 x 12 cores clinically cT3 a N0 M0 with initial PSA of 7.42        Imaging:       MR prostate with and without contrast:  11/12/2024    2 x 1.3 x 1.9 cm T2 hypo, diffusion restricted, early enhancing lesion at the left base to mid lateral PZ with maryjo EPE and lateral Lt NVB invasion.  No S3, lymph node or bone involvement.  PI-RADS 5    Past Medical History:   Diagnosis Date    Anxiety disorder, unspecified     GERD (gastroesophageal reflux disease)     Hypertension     Skin cancer     Type II diabetes mellitus      Family History   Problem Relation Name Age of Onset    Parkinsonism Father      Alzheimer's disease Father      Throat cancer Mother       Social History[1]  Past Surgical History:   Procedure Laterality Date    COLONOSCOPY      FINGER SURGERY Right     middle and ring finger for trigger finger    SHOULDER ARTHROSCOPY Left     SKIN CANCER EXCISION      TONSILLECTOMY           Review of systems:  Review of Systems    Review of Systems   Constitutional: Negative for activity change, appetite change, chills, diaphoresis, fatigue and unexpected weight change.   HENT: Negative for congestion, facial swelling, hearing loss, mouth sores, trouble swallowing and voice change.    Eyes: Negative for photophobia, pain, discharge and itching.   Respiratory: Negative for apnea, cough, choking, chest tightness and shortness of breath.    Cardiovascular: Negative for chest pain, palpitations and leg swelling.   Gastrointestinal: Negative for abdominal distention, abdominal pain, anal bleeding and blood in stool.   Endocrine:  Negative for cold intolerance, heat intolerance, polydipsia and polyphagia.   Genitourinary: Negative for difficulty urinating, dysuria, flank pain and hematuria.   Musculoskeletal: Negative for arthralgias, back pain, joint swelling, myalgias, neck pain and neck stiffness.   Skin: Negative for color change, pallor and wound.   Allergic/Immunologic: Negative for environmental allergies, food allergies and immunocompromised state.   Neurological: Negative for dizziness, seizures, facial asymmetry, speech difficulty, light-headedness, numbness and headaches.   Hematological: Negative for adenopathy. Does not bruise/bleed easily.   Psychiatric/Behavioral: Negative for agitation, behavioral problems, confusion, decreased concentration and sleep disturbance.       Physical Exam  Vitals and nursing note reviewed.   Constitutional:       General: He is not in acute distress.     Appearance: Normal appearance. He is not ill-appearing.   HENT:      Head: Normocephalic and atraumatic.      Nose: No congestion or rhinorrhea.   Eyes:      General: No scleral icterus.     Extraocular Movements: Extraocular movements intact.      Pupils: Pupils are equal, round, and reactive to light.   Cardiovascular:      Rate and Rhythm: Normal rate and regular rhythm.      Pulses: Normal pulses.      Heart sounds: Normal heart sounds. No murmur heard.     No gallop.   Pulmonary:      Effort: Pulmonary effort is normal. No respiratory distress.      Breath sounds: Normal breath sounds. No stridor. No wheezing or rhonchi.   Abdominal:      General: Bowel sounds are normal. There is no distension.      Palpations: There is no mass.      Tenderness: There is no abdominal tenderness. There is no guarding.   Musculoskeletal:         General: No swelling, tenderness, deformity or signs of injury. Normal range of motion.      Cervical back: Normal range of motion and neck supple. No rigidity. No muscular tenderness.      Right lower leg: No edema.       Left lower leg: No edema.   Skin:     General: Skin is warm.      Coloration: Skin is not jaundiced or pale.      Findings: No bruising or lesion.   Neurological:      General: No focal deficit present.      Mental Status: He is alert and oriented to person, place, and time.      Cranial Nerves: No cranial nerve deficit.      Sensory: No sensory deficit.      Motor: No weakness.      Gait: Gait normal.   Psychiatric:         Mood and Affect: Mood normal.         Behavior: Behavior normal.         Thought Content: Thought content normal.                                           Vitals:    04/23/25 0957   BP: (!) 147/71   Pulse: 100   Resp: 16   Body surface area is 2.23 meters squared.    Assessment/Plan:      ECOG 1      Very high-risk prostate cancer:    == [place] 4 + 5 in 5 x 12 cores, cT3 N0 M0 with initial PSA of 7.42.    == No evidence of metastatic disease on imaging and bone scan. Patient has been evaluated by Radiation Oncology with plan for curative intent radiotherapy.  Considering very high-risk prostate cancer per NCCN guidelines patient would need androgen deprivation treatment with Eligard plus Casodex versus  ADT plus Zytiga.  After detailed discussion he is agreeable to start Casodex and I will send prescription in this regard which he needs to take for 12-36 months per NCCN guidelines. If agreeable to Zytiga then duration will be 24 months.   ==04/23/2023: PSA 0.59, tolerating casodex well, will be starting radiation next week with Dr. Samson.  Will continue casodex, pt lives in [place], requests [place] appt.      Plan:  Start Casodex  Continue Lupron with Urology  Follow up with Rad-onc    RTC in 8 weeks for MD visit with labs: CBC, CMP, PSA prior at [facility]        Advance Care Planning     Date: 03/10/2025    Power of   I initiated the process of voluntary advance care planning today and explained the importance of this process to the patient.  I introduced the concept of advance  directives to the patient, as well. Then the patient received detailed information about the importance of designating a Health Care Power of  (HCPOA). He was also instructed to communicate with this person about their wishes for future healthcare, should he become sick and lose decision-making capacity. The patient has not previously appointed a HCPOA. After our discussion, the patient has decided to complete a HCPOA and has appointed his    , health care agent. I encouraged him to communicate with this person about their wishes for future healthcare, should he become sick and lose decision-making capacity.      A total of 20 min was spent on advance care planning, goals of care discussion, emotional support, formulating and communicating prognosis and exploring burden/benefit of various approaches of treatment. This discussion occurred on a fully voluntary basis with the verbal consent of the patient and/or family.           Total time spent in counseling and discussion about further management options including relevant lab work, treatment,  prognosis, medications and intended side effects was more than 30 minutes. More than 50% of the time was spent on counseling and coordination of care.  I spent a total of 30 minutes on the day of the visit.This includes face to face time and non-face to face time preparing to see the patient (eg, review of tests), Obtaining and/or reviewing separately obtained history, Documenting clinical information in the electronic or other health record, Independently interpreting resultsand communicating results to the patient/family/caregiver, or Care coordination.              [1]   Social History  Socioeconomic History    Marital status:    Tobacco Use    Smoking status: Never     Passive exposure: Never    Smokeless tobacco: Never   Substance and Sexual Activity    Alcohol use: Not Currently     Alcohol/week: 2.0 standard drinks of alcohol     Types: 2 Cans of beer  per week     Comment: socially about 1-2 beers

## 2025-05-21 DIAGNOSIS — C61 PROSTATE CANCER: Primary | ICD-10-CM

## 2025-06-12 ENCOUNTER — OFFICE VISIT (OUTPATIENT)
Facility: CLINIC | Age: 75
End: 2025-06-12
Payer: MEDICARE

## 2025-06-12 VITALS
SYSTOLIC BLOOD PRESSURE: 145 MMHG | OXYGEN SATURATION: 94 % | RESPIRATION RATE: 16 BRPM | WEIGHT: 221.88 LBS | BODY MASS INDEX: 31.06 KG/M2 | HEIGHT: 71 IN | TEMPERATURE: 98 F | DIASTOLIC BLOOD PRESSURE: 80 MMHG | HEART RATE: 76 BPM

## 2025-06-12 DIAGNOSIS — C61 PROSTATE CANCER: Primary | ICD-10-CM

## 2025-09-05 ENCOUNTER — DOCUMENTATION ONLY (OUTPATIENT)
Dept: HEMATOLOGY/ONCOLOGY | Facility: CLINIC | Age: 75
End: 2025-09-05
Payer: MEDICARE